# Patient Record
Sex: FEMALE | Race: WHITE | NOT HISPANIC OR LATINO
[De-identification: names, ages, dates, MRNs, and addresses within clinical notes are randomized per-mention and may not be internally consistent; named-entity substitution may affect disease eponyms.]

---

## 2017-10-25 ENCOUNTER — RESULT REVIEW (OUTPATIENT)
Age: 22
End: 2017-10-25

## 2018-11-05 ENCOUNTER — RESULT REVIEW (OUTPATIENT)
Age: 23
End: 2018-11-05

## 2019-03-22 ENCOUNTER — APPOINTMENT (OUTPATIENT)
Dept: RHEUMATOLOGY | Facility: CLINIC | Age: 24
End: 2019-03-22
Payer: COMMERCIAL

## 2019-03-22 VITALS
TEMPERATURE: 98.1 F | HEIGHT: 64 IN | SYSTOLIC BLOOD PRESSURE: 120 MMHG | HEART RATE: 67 BPM | DIASTOLIC BLOOD PRESSURE: 78 MMHG | BODY MASS INDEX: 27.66 KG/M2 | RESPIRATION RATE: 16 BRPM | OXYGEN SATURATION: 99 % | WEIGHT: 162 LBS

## 2019-03-22 DIAGNOSIS — Z86.19 PERSONAL HISTORY OF OTHER INFECTIOUS AND PARASITIC DISEASES: ICD-10-CM

## 2019-03-22 DIAGNOSIS — Z82.3 FAMILY HISTORY OF STROKE: ICD-10-CM

## 2019-03-22 DIAGNOSIS — Z82.61 FAMILY HISTORY OF ARTHRITIS: ICD-10-CM

## 2019-03-22 PROCEDURE — 99243 OFF/OP CNSLTJ NEW/EST LOW 30: CPT

## 2019-03-22 RX ORDER — BACILLUS COAGULANS/INULIN 1B-250 MG
CAPSULE ORAL
Refills: 0 | Status: ACTIVE | COMMUNITY

## 2019-03-23 PROBLEM — Z82.3 FAMILY HISTORY OF CEREBROVASCULAR ACCIDENT (CVA): Status: ACTIVE | Noted: 2019-03-22

## 2019-03-23 PROBLEM — Z82.61 FAMILY HISTORY OF ARTHRITIS: Status: ACTIVE | Noted: 2019-03-22

## 2019-03-23 PROBLEM — Z86.19 HISTORY OF STAPHYLOCOCCAL INFECTION: Status: RESOLVED | Noted: 2019-03-22 | Resolved: 2019-03-23

## 2019-03-23 NOTE — CONSULT LETTER
[Consult Letter:] : I had the pleasure of evaluating your patient, [unfilled]. 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  Rheum eval x 2 in past negative.  Would like to minimize medications \par Little improvement w/ steroids or NSAIDs in past, PT worsened sx. Feels best when eating well and exercising. \par - Sleep:  chronc fatigue with no trouble falling or staying asleep, but always NRS since young age.  No evaluation, no snoring... willing to consider sleep study \par - Chronic LBP:  and not associated w/ swelling or stiffness but can be severe 6/10 and when bad limits activity.  \par - GI issues:  generalized abd pain, bloating for ys, usually constipation... better with healthy diet.  Denies bloody stools, wt loss or other \par - paresthesias migratory pattern w/ no hx weakness, mild \par -  mild anxiety disorder over ys, well controlled w/ no medications.  Denies hx trauma, stress, or drug abuse/ misuse. \par - mild dyscognition, worse w/ poor sleep and stress... \par \par Discussion:  does not at this time meet criteria for FM but has many sx consistent w/ FM... if doesn't address sleep issues likely to become more consistent\par - little to suggest CTD at this time, though likely autoimmune thyroid dz... which then increased risk for other autoimmune conditions.  \par \par 2) Sleep disturbance:  NRS... for ys, consider sleep study.  Likely inefficienct sleep \par \par Plan:\par - full lupus serologies again\par - sleep study\par - lengthy discussion need to monitor and address sleep, continue exercising routinely, and manage stress.  Would not recommend night shift... this would only exacerbate sleep disturbance.  \par - rto 6 wks following sleep study

## 2019-03-23 NOTE — ASSESSMENT
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  Rheum eval x 2 in past negative.  Would like to minimize medications \par Little improvement w/ steroids or NSAIDs in past, PT worsened sx. Feels best when eating well and exercising. \par - Sleep:  chronc fatigue with no trouble falling or staying asleep, but always NRS since young age.  No evaluation, no snoring... willing to consider sleep study \par - Chronic LBP:  and not associated w/ swelling or stiffness but can be severe 6/10 and when bad limits activity.  \par - GI issues:  generalized abd pain, bloating for ys, usually constipation... better with healthy diet.  Denies bloody stools, wt loss or other \par - paresthesias migratory pattern w/ no hx weakness, mild \par -  mild anxiety disorder over ys, well controlled w/ no medications.  Denies hx trauma, stress, or drug abuse/ misuse. \par - mild dyscognition, worse w/ poor sleep and stress... \par \par Discussion:  does not at this time meet criteria for FM but has many sx consistent w/ FM... if doesn't address sleep issues likely to become more consistent\par - little to suggest CTD at this time, though likely autoimmune thyroid dz... which then increased risk for other autoimmune conditions.  \par \par 2) Sleep disturbance:  NRS... for ys, consider sleep study.  Likely inefficienct sleep \par \par Plan:\par - full lupus serologies again\par - sleep study\par - lengthy discussion need to monitor and address sleep, continue exercising routinely, and manage stress.  Would not recommend night shift... this would only exacerbate sleep disturbance.  \par - rto 6 wks following sleep study

## 2019-03-23 NOTE — DATA REVIEWED
[FreeTextEntry1] : Labs: \par 2019 TSH > 7, nl CBC,CMP, CRP, ESR \par \par 2013 & 2017 \par ADAM 1:80H w/ neg subserologies, neg HIV, Hep B/ C, Lyme and other tick born infections\par \par Diagnostics: \par [id number] Imaing of Spine:  cervical/ lumbar w/out spondylosis, scoliosis, HNP

## 2019-03-23 NOTE — PHYSICAL EXAM
[General Appearance - Alert] : alert [General Appearance - In No Acute Distress] : in no acute distress [General Appearance - Well Nourished] : well nourished [General Appearance - Well Developed] : well developed [General Appearance - Well-Appearing] : healthy appearing [Sclera] : the sclera and conjunctiva were normal [PERRL With Normal Accommodation] : pupils were equal in size, round, and reactive to light [Extraocular Movements] : extraocular movements were intact [Outer Ear] : the ears and nose were normal in appearance [Oropharynx] : the oropharynx was normal [Neck Appearance] : the appearance of the neck was normal [Neck Cervical Mass (___cm)] : no neck mass was observed [Jugular Venous Distention Increased] : there was no jugular-venous distention [Thyroid Diffuse Enlargement] : the thyroid was not enlarged [Thyroid Nodule] : there were no palpable thyroid nodules [Auscultation Breath Sounds / Voice Sounds] : lungs were clear to auscultation bilaterally [Heart Rate And Rhythm] : heart rate was normal and rhythm regular [Heart Sounds] : normal S1 and S2 [Heart Sounds Gallop] : no gallops [Murmurs] : no murmurs [Heart Sounds Pericardial Friction Rub] : no pericardial rub [Full Pulse] : the pedal pulses are present [Edema] : there was no peripheral edema [Bowel Sounds] : normal bowel sounds [Abdomen Soft] : soft [Abdomen Tenderness] : non-tender [Abdomen Mass (___ Cm)] : no abdominal mass palpated [Cervical Lymph Nodes Enlarged Posterior Bilaterally] : posterior cervical [Cervical Lymph Nodes Enlarged Anterior Bilaterally] : anterior cervical [Supraclavicular Lymph Nodes Enlarged Bilaterally] : supraclavicular [No CVA Tenderness] : no ~M costovertebral angle tenderness [No Spinal Tenderness] : no spinal tenderness [Abnormal Walk] : normal gait [Nail Clubbing] : no clubbing  or cyanosis of the fingernails [Musculoskeletal - Swelling] : no joint swelling seen [Motor Tone] : muscle strength and tone were normal [Skin Color & Pigmentation] : normal skin color and pigmentation [Skin Turgor] : normal skin turgor [] : no rash [Sensation] : the sensory exam was normal to light touch and pinprick [No Focal Deficits] : no focal deficits [Oriented To Time, Place, And Person] : oriented to person, place, and time [Impaired Insight] : insight and judgment were intact [Affect] : the affect was normal [Over the Past 2 Weeks, Have You Felt Down, Depressed, or Hopeless?] : 1.) Over the past 2 weeks, have you felt down, depressed, or hopeless? No [Over the Past 2 Weeks, Have You Felt Little Interest or Pleasure Doing Things?] : 2.) Over the past 2 weeks, have you felt little interest or pleasure doing things? No [FreeTextEntry1] : no evidence of anxiety today

## 2019-03-23 NOTE — HISTORY OF PRESENT ILLNESS
[FreeTextEntry1] : (Initial HPI 3/22/1) \par 22 yo wf with long hx chronic pain- mild diffuse arthralgias since childhood, several evaluations over many ys negative except low + ADAM 1:80 w/ neg subserologies though TPO/TG done\par Recent dx w/ hypothyroidism and started on synthroid 6 wks ago\par \par - Polyarthralgias:  diffuse, symmetrical and not associated w/ swelling or stiffness but can be severe 6/10 and when bad limits activity.  Today R ankle/ foot pain considerable... otherwise ok.  Little improvement over ys w/ NSAIDs, steroids or PT.\par Does exercise routinely 3-6 days/ wk kick boxing \par \par - Chronic LBP:  worse w/ use, not issue in am... at times severe... PT made worse.  \par \par - Chronic fatigue with no trouble falling or staying asleep, but always NRS since young age.  \par \par - chronic GI issues:  generalized abd pain, bloating for ys, usually constipation... better with healthy diet.  Denies bloody stools, wt loss or other \par \par - Occas paresthesias migratory pattern w/ no hx weakness\par \par - mild anxiety disorder over ys, well controlled w/ no medications.  Denies hx trauma, stress, or drug abuse/ misuse.  Occas ETOH but no illicit drug use, no marijuana.  Sexually active but reports use protection

## 2019-03-23 NOTE — REVIEW OF SYSTEMS
[Feeling Tired] : feeling tired [Abdominal Pain] : abdominal pain [Constipation] : constipation [Arthralgias] : arthralgias [Joint Pain] : joint pain [As Noted in HPI] : as noted in HPI [Sleep Disturbances] : sleep disturbances [Negative] : Heme/Lymph [FreeTextEntry2] : at times feels badly, doing well today [FreeTextEntry5] : mild intermitttent dizziness  [de-identified] : occas paresthesias  [de-identified] : NRS- denies snoring and no idea if any apneic episodes  [de-identified] : denies cytopenias, bleeding or clotting dyscrasias

## 2019-05-31 ENCOUNTER — APPOINTMENT (OUTPATIENT)
Dept: RHEUMATOLOGY | Facility: CLINIC | Age: 24
End: 2019-05-31

## 2019-07-07 PROBLEM — Z86.19 HISTORY OF CLOSTRIDIUM DIFFICILE COLITIS: Status: RESOLVED | Noted: 2019-03-22 | Resolved: 2019-07-07

## 2019-09-30 ENCOUNTER — RECORD ABSTRACTING (OUTPATIENT)
Age: 24
End: 2019-09-30

## 2019-09-30 DIAGNOSIS — R87.610 ATYPICAL SQUAMOUS CELLS OF UNDETERMINED SIGNIFICANCE ON CYTOLOGIC SMEAR OF CERVIX (ASC-US): ICD-10-CM

## 2019-09-30 DIAGNOSIS — Z80.3 FAMILY HISTORY OF MALIGNANT NEOPLASM OF BREAST: ICD-10-CM

## 2019-09-30 DIAGNOSIS — Z82.49 FAMILY HISTORY OF ISCHEMIC HEART DISEASE AND OTHER DISEASES OF THE CIRCULATORY SYSTEM: ICD-10-CM

## 2019-09-30 DIAGNOSIS — Z87.448 PERSONAL HISTORY OF OTHER DISEASES OF URINARY SYSTEM: ICD-10-CM

## 2019-09-30 DIAGNOSIS — Z86.19 PERSONAL HISTORY OF OTHER INFECTIOUS AND PARASITIC DISEASES: ICD-10-CM

## 2019-09-30 DIAGNOSIS — Z78.9 OTHER SPECIFIED HEALTH STATUS: ICD-10-CM

## 2019-09-30 LAB — CYTOLOGY CVX/VAG DOC THIN PREP: NORMAL

## 2019-11-18 ENCOUNTER — APPOINTMENT (OUTPATIENT)
Dept: OBGYN | Facility: CLINIC | Age: 24
End: 2019-11-18
Payer: COMMERCIAL

## 2019-11-18 VITALS
WEIGHT: 155 LBS | BODY MASS INDEX: 26.46 KG/M2 | DIASTOLIC BLOOD PRESSURE: 62 MMHG | HEIGHT: 64 IN | SYSTOLIC BLOOD PRESSURE: 104 MMHG

## 2019-11-18 PROCEDURE — 99395 PREV VISIT EST AGE 18-39: CPT

## 2019-11-18 NOTE — HISTORY OF PRESENT ILLNESS
[Last Mammogram ___] : Last Mammogram was [unfilled] [Last Bone Density ___] : Last bone density studies [unfilled] [Last Colonoscopy ___] : Last colonoscopy [unfilled] [Last Pap ___] : Last cervical pap smear was [unfilled] [Menarche Age: ____] : age at menarche was [unfilled] [Definite:  ___ (Date)] : the last menstrual period was [unfilled] [Sexually Active] : is sexually active [Monogamous] : is monogamous [Contraception] : uses contraception [Oral Contraceptives] : uses oral contraceptives [Male ___] : [unfilled] male [Yes] : yes

## 2019-11-23 LAB
C TRACH RRNA SPEC QL NAA+PROBE: NOT DETECTED
N GONORRHOEA RRNA SPEC QL NAA+PROBE: NOT DETECTED
SOURCE TP AMPLIFICATION: NORMAL

## 2019-11-30 LAB — CYTOLOGY CVX/VAG DOC THIN PREP: ABNORMAL

## 2020-10-21 ENCOUNTER — RX RENEWAL (OUTPATIENT)
Age: 25
End: 2020-10-21

## 2020-11-30 ENCOUNTER — APPOINTMENT (OUTPATIENT)
Dept: FAMILY MEDICINE | Facility: CLINIC | Age: 25
End: 2020-11-30
Payer: COMMERCIAL

## 2020-11-30 ENCOUNTER — APPOINTMENT (OUTPATIENT)
Dept: OBGYN | Facility: CLINIC | Age: 25
End: 2020-11-30
Payer: COMMERCIAL

## 2020-11-30 VITALS
TEMPERATURE: 97.3 F | BODY MASS INDEX: 25.69 KG/M2 | WEIGHT: 145 LBS | SYSTOLIC BLOOD PRESSURE: 110 MMHG | DIASTOLIC BLOOD PRESSURE: 72 MMHG | HEIGHT: 63 IN

## 2020-11-30 VITALS
DIASTOLIC BLOOD PRESSURE: 70 MMHG | HEART RATE: 76 BPM | TEMPERATURE: 98.3 F | SYSTOLIC BLOOD PRESSURE: 110 MMHG | OXYGEN SATURATION: 98 % | HEIGHT: 64 IN | WEIGHT: 152 LBS | BODY MASS INDEX: 25.95 KG/M2

## 2020-11-30 DIAGNOSIS — Z86.59 PERSONAL HISTORY OF OTHER MENTAL AND BEHAVIORAL DISORDERS: ICD-10-CM

## 2020-11-30 DIAGNOSIS — Z83.3 FAMILY HISTORY OF DIABETES MELLITUS: ICD-10-CM

## 2020-11-30 DIAGNOSIS — Z87.898 PERSONAL HISTORY OF OTHER SPECIFIED CONDITIONS: ICD-10-CM

## 2020-11-30 DIAGNOSIS — Z82.49 FAMILY HISTORY OF ISCHEMIC HEART DISEASE AND OTHER DISEASES OF THE CIRCULATORY SYSTEM: ICD-10-CM

## 2020-11-30 DIAGNOSIS — Z01.419 ENCOUNTER FOR GYNECOLOGICAL EXAMINATION (GENERAL) (ROUTINE) W/OUT ABNORMAL FINDINGS: ICD-10-CM

## 2020-11-30 DIAGNOSIS — Z80.8 FAMILY HISTORY OF MALIGNANT NEOPLASM OF OTHER ORGANS OR SYSTEMS: ICD-10-CM

## 2020-11-30 DIAGNOSIS — Z30.9 ENCOUNTER FOR CONTRACEPTIVE MANAGEMENT, UNSPECIFIED: ICD-10-CM

## 2020-11-30 PROCEDURE — 99395 PREV VISIT EST AGE 18-39: CPT

## 2020-11-30 PROCEDURE — 99072 ADDL SUPL MATRL&STAF TM PHE: CPT

## 2020-11-30 PROCEDURE — 99214 OFFICE O/P EST MOD 30 MIN: CPT

## 2020-11-30 RX ORDER — ACETAMINOPHEN 325 MG
5000 TABLET ORAL
Refills: 0 | Status: DISCONTINUED | COMMUNITY
End: 2020-11-30

## 2020-11-30 RX ORDER — NORETHINDRONE ACETATE AND ETHINYL ESTRADIOL AND FERROUS FUMARATE 1.5-30(21)
1.5-3 KIT ORAL
Refills: 0 | Status: DISCONTINUED | COMMUNITY
End: 2020-11-30

## 2020-11-30 RX ORDER — NORETHINDRONE ACETATE AND ETHINYL ESTRADIOL AND FERROUS FUMARATE 1.5-30(21)
1.5-3 KIT ORAL
Qty: 2 | Refills: 0 | Status: DISCONTINUED | COMMUNITY
Start: 2019-10-04 | End: 2020-11-30

## 2020-11-30 NOTE — HISTORY OF PRESENT ILLNESS
[FreeTextEntry1] : New pt CPE\par No complaints today [de-identified] : 24 yo female with pmhx of hypothyroidism presents to establish care. Pt has no acute complaints. Denies fever, chills, cp, palpitations, sob, n/v, heat/cold intolerance, muscle weakness, dizziness or calf pain.

## 2020-11-30 NOTE — ASSESSMENT
[FreeTextEntry1] : Hypothyroidism: currently on synthroid 25 mcg daily, asymptomatic, c/w current regimen, f/u TSH \par Hx of polyarthralgia with weakly positive ADAM: pt sees rheumatology, currently asymptomatic, f/u rheum\par Difficulty sleeping: start melatonin 5 mg daily \par RTC 4 - 6 weeks

## 2020-11-30 NOTE — HEALTH RISK ASSESSMENT
[Very Good] : ~his/her~  mood as very good [] : No [Yes] : Yes [Monthly or less (1 pt)] : Monthly or less (1 point) [1 or 2 (0 pts)] : 1 or 2 (0 points) [Never (0 pts)] : Never (0 points) [No] : In the past 12 months have you used drugs other than those required for medical reasons? No [No falls in past year] : Patient reported no falls in the past year [0] : 2) Feeling down, depressed, or hopeless: Not at all (0) [Audit-CScore] : 1 [de-identified] : regularly  [de-identified] : good [NUC4Mgthm] : 0 [Patient reported PAP Smear was normal] : Patient reported PAP Smear was normal [HIV test declined] : HIV test declined [Hepatitis C test declined] : Hepatitis C test declined [With Family] : lives with family [Employed] : employed [Single] : single [Sexually Active] : sexually active [High Risk Behavior] : no high risk behavior [Feels Safe at Home] : Feels safe at home [Fully functional (bathing, dressing, toileting, transferring, walking, feeding)] : Fully functional (bathing, dressing, toileting, transferring, walking, feeding) [Fully functional (using the telephone, shopping, preparing meals, housekeeping, doing laundry, using] : Fully functional and needs no help or supervision to perform IADLs (using the telephone, shopping, preparing meals, housekeeping, doing laundry, using transportation, managing medications and managing finances) [Reports changes in hearing] : Reports no changes in hearing [Reports changes in vision] : Reports no changes in vision [Reports changes in dental health] : Reports no changes in dental health [PapSmearDate] : 01/19

## 2020-12-05 PROBLEM — Z80.8 FAMILY HISTORY OF MALIGNANT NEOPLASM OF SKIN: Status: ACTIVE | Noted: 2020-11-30

## 2020-12-05 LAB
C TRACH RRNA SPEC QL NAA+PROBE: NOT DETECTED
CYTOLOGY CVX/VAG DOC THIN PREP: ABNORMAL
N GONORRHOEA RRNA SPEC QL NAA+PROBE: NOT DETECTED
SOURCE TP AMPLIFICATION: NORMAL
TSH SERPL-ACNC: 1.56 UIU/ML

## 2020-12-05 NOTE — HISTORY OF PRESENT ILLNESS
[Patient reported PAP Smear was normal] : Patient reported PAP Smear was normal [Oral Contraceptive] : uses oral contraception pills [Y] : Patient is sexually active [N] : Patient denies prior pregnancies [Menarche Age: ____] : age at menarche was [unfilled] [No] : Patient does not have concerns regarding sex [Currently Active] : currently active [Men] : men [Yes] : Yes [TextBox_4] : 25-year-old G0 LMP 11/9/2020 annual gynecological exam\par \par Patient is requesting to continue oral contraceptives and tolerating them well\par \par Instructions and precautions for use were reviewed\par \par  [PapSmeardate] : 11/18/2019 [LMPDate] : 11/09/2020 [PGHxTotal] : 0 [FreeTextEntry1] : 11/09/2020 [FreeTextEntry3] : oral contraceptive

## 2020-12-23 PROBLEM — Z30.9 ENCOUNTER FOR BIRTH CONTROL: Status: RESOLVED | Noted: 2019-10-04 | Resolved: 2020-12-23

## 2020-12-23 PROBLEM — Z01.419 ENCOUNTER FOR GYNECOLOGICAL EXAMINATION WITHOUT ABNORMAL FINDING: Status: RESOLVED | Noted: 2019-11-18 | Resolved: 2020-12-23

## 2021-02-02 ENCOUNTER — APPOINTMENT (OUTPATIENT)
Dept: FAMILY MEDICINE | Facility: CLINIC | Age: 26
End: 2021-02-02

## 2021-08-09 ENCOUNTER — NON-APPOINTMENT (OUTPATIENT)
Age: 26
End: 2021-08-09

## 2021-08-09 ENCOUNTER — APPOINTMENT (OUTPATIENT)
Dept: FAMILY MEDICINE | Facility: CLINIC | Age: 26
End: 2021-08-09
Payer: COMMERCIAL

## 2021-08-09 VITALS
TEMPERATURE: 98.4 F | HEART RATE: 64 BPM | WEIGHT: 145 LBS | DIASTOLIC BLOOD PRESSURE: 78 MMHG | BODY MASS INDEX: 25.69 KG/M2 | HEIGHT: 63 IN | SYSTOLIC BLOOD PRESSURE: 120 MMHG | OXYGEN SATURATION: 100 %

## 2021-08-09 DIAGNOSIS — Z87.898 PERSONAL HISTORY OF OTHER SPECIFIED CONDITIONS: ICD-10-CM

## 2021-08-09 DIAGNOSIS — Z87.42 PERSONAL HISTORY OF OTHER DISEASES OF THE FEMALE GENITAL TRACT: ICD-10-CM

## 2021-08-09 DIAGNOSIS — Z86.14 PERSONAL HISTORY OF METHICILLIN RESISTANT STAPHYLOCOCCUS AUREUS INFECTION: ICD-10-CM

## 2021-08-09 PROCEDURE — G0442 ANNUAL ALCOHOL SCREEN 15 MIN: CPT | Mod: NC

## 2021-08-09 PROCEDURE — 99395 PREV VISIT EST AGE 18-39: CPT | Mod: 25

## 2021-08-09 PROCEDURE — G0444 DEPRESSION SCREEN ANNUAL: CPT | Mod: NC,59

## 2021-08-09 RX ORDER — NORETHINDRONE ACETATE AND ETHINYL ESTRADIOL AND FERROUS FUMARATE 1.5-30(21)
1.5-3 KIT ORAL DAILY
Qty: 84 | Refills: 3 | Status: DISCONTINUED | COMMUNITY
Start: 2019-11-18 | End: 2021-08-09

## 2021-08-09 RX ORDER — LEVOTHYROXINE SODIUM 200 MCG
VIAL (EA) INTRAVENOUS
Refills: 0 | Status: DISCONTINUED | COMMUNITY
End: 2021-08-09

## 2021-08-09 NOTE — HEALTH RISK ASSESSMENT
[Patient reported PAP Smear was normal] : Patient reported PAP Smear was normal [Very Good] : ~his/her~  mood as very good [] : No [1 or 2 (0 pts)] : 1 or 2 (0 points) [Never (0 pts)] : Never (0 points) [No] : In the past 12 months have you used drugs other than those required for medical reasons? No [No falls in past year] : Patient reported no falls in the past year [0] : 2) Feeling down, depressed, or hopeless: Not at all (0) [PHQ-2 Negative - No further assessment needed] : PHQ-2 Negative - No further assessment needed [Audit-CScore] : 0 [de-identified] : regularly [de-identified] : healthy [KLP2Vpywc] : 0 [HIV test declined] : HIV test declined [Hepatitis C test declined] : Hepatitis C test declined [With Family] : lives with family [Employed] : employed [Significant Other] : lives with significant other [Sexually Active] : sexually active [High Risk Behavior] : no high risk behavior [Feels Safe at Home] : Feels safe at home [Fully functional (bathing, dressing, toileting, transferring, walking, feeding)] : Fully functional (bathing, dressing, toileting, transferring, walking, feeding) [Fully functional (using the telephone, shopping, preparing meals, housekeeping, doing laundry, using] : Fully functional and needs no help or supervision to perform IADLs (using the telephone, shopping, preparing meals, housekeeping, doing laundry, using transportation, managing medications and managing finances) [Reports changes in hearing] : Reports no changes in hearing [Reports changes in vision] : Reports no changes in vision [Reports changes in dental health] : Reports no changes in dental health [PapSmearDate] : 11/20 [PapSmearComments] : NILM

## 2021-08-09 NOTE — COUNSELING
[Fall prevention counseling provided] : Fall prevention counseling provided [Risk of tobacco use and health benefits of smoking cessation discussed] : Risk of tobacco use and health benefits of smoking cessation discussed [AUDIT-C Screening administered and reviewed] : AUDIT-C Screening administered and reviewed [Potential consequences of obesity discussed] : Potential consequences of obesity discussed [Benefits of weight loss discussed] : Benefits of weight loss discussed [Encouraged to maintain food diary] : Encouraged to maintain food diary [Encouraged to increase physical activity] : Encouraged to increase physical activity [Encouraged to use exercise tracking device] : Encouraged to use exercise tracking device

## 2021-08-09 NOTE — ASSESSMENT
[FreeTextEntry1] : Annual physical: f/u routine labwork\par Hx of hypothyroidism: f/u TSH, Free T4, c/w levothyroxine 25 mcg po daily\par Hx of polyarthralgia: currently asymptomatic, weakly positive ADAM in the past, f/u rheumatology\par RTC 3 wks

## 2021-08-09 NOTE — HISTORY OF PRESENT ILLNESS
[FreeTextEntry1] : CPE [de-identified] : 27 yo female presents for annual physical. No acute complaints. Denies fever, chills, cp, palpitations, sob, nv, heat/cold intolerance, dizziness, melena, hematochezia, muscle weakness, loss of sensation, bowel/bladder incontinence or calf pain.\par

## 2021-09-16 ENCOUNTER — OUTPATIENT (OUTPATIENT)
Dept: OUTPATIENT SERVICES | Facility: HOSPITAL | Age: 26
LOS: 1 days | End: 2021-09-16
Payer: COMMERCIAL

## 2021-09-16 ENCOUNTER — APPOINTMENT (OUTPATIENT)
Dept: RADIOLOGY | Facility: CLINIC | Age: 26
End: 2021-09-16
Payer: COMMERCIAL

## 2021-09-16 DIAGNOSIS — M25.531 PAIN IN RIGHT WRIST: ICD-10-CM

## 2021-09-16 PROCEDURE — 73110 X-RAY EXAM OF WRIST: CPT

## 2021-09-16 PROCEDURE — 73110 X-RAY EXAM OF WRIST: CPT | Mod: 26,RT

## 2021-10-06 ENCOUNTER — APPOINTMENT (OUTPATIENT)
Dept: MRI IMAGING | Facility: CLINIC | Age: 26
End: 2021-10-06

## 2021-12-04 ENCOUNTER — NON-APPOINTMENT (OUTPATIENT)
Age: 26
End: 2021-12-04

## 2021-12-04 ENCOUNTER — APPOINTMENT (OUTPATIENT)
Dept: OBGYN | Facility: CLINIC | Age: 26
End: 2021-12-04
Payer: COMMERCIAL

## 2021-12-04 VITALS
HEIGHT: 63 IN | DIASTOLIC BLOOD PRESSURE: 78 MMHG | WEIGHT: 143 LBS | BODY MASS INDEX: 25.34 KG/M2 | SYSTOLIC BLOOD PRESSURE: 118 MMHG

## 2021-12-04 DIAGNOSIS — Z01.419 ENCOUNTER FOR GYNECOLOGICAL EXAMINATION (GENERAL) (ROUTINE) W/OUT ABNORMAL FINDINGS: ICD-10-CM

## 2021-12-04 DIAGNOSIS — Z11.3 ENCOUNTER FOR SCREENING FOR INFECTIONS WITH A PREDOMINANTLY SEXUAL MODE OF TRANSMISSION: ICD-10-CM

## 2021-12-04 PROCEDURE — 99395 PREV VISIT EST AGE 18-39: CPT

## 2021-12-04 RX ORDER — LACTOBACILLUS RHAMNOSUS GG 10B CELL
CAPSULE ORAL
Refills: 0 | Status: DISCONTINUED | COMMUNITY
End: 2021-12-04

## 2021-12-06 PROBLEM — Z01.419 ENCOUNTER FOR ANNUAL ROUTINE GYNECOLOGICAL EXAMINATION: Status: RESOLVED | Noted: 2021-12-06 | Resolved: 2021-12-20

## 2021-12-06 NOTE — PLAN
[FreeTextEntry1] : Importance of monthly self breast exam reviewed\par The importance of daily calcium exercise and vitamin D reviewed\par The importance of safe sex was reviewed\par Follow-up in 1 year discussed\par Instructions and precautions for oral contraceptive use reviewed\par Patient verbalizes good understanding of all instructions

## 2021-12-06 NOTE — HISTORY OF PRESENT ILLNESS
[Oral Contraceptive] : uses oral contraception pills [Y] : Patient is sexually active [N] : Patient denies prior pregnancies [Menarche Age: ____] : age at menarche was [unfilled] [Currently Active] : currently active [Men] : men [No] : No [Withdrawal] : uses withdrawal [PapSmeardate] : 11/30/20 [TextBox_31] : NEG [GonorrheaDate] : 11/30/20 [TextBox_63] : NEG [ChlamydiaDate] : 11/30/20 [TextBox_68] : NEG [HPVDate] : 1 [LMPDate] : 11/6/21 [PGHxTotal] : 0 [FreeTextEntry1] : 11/6/21

## 2021-12-06 NOTE — COUNSELING
[Nutrition/ Exercise/ Weight Management] : nutrition, exercise, weight management [Vitamins/Supplements] : vitamins/supplements [Smoking Cessation] : smoking cessation [Breast Self Exam] : breast self exam [Contraception/ Emergency Contraception/ Safe Sexual Practices] : contraception, emergency contraception, safe sexual practices [STD (testing, results, tx)] : STD (testing, results, tx) [Vaccines] : vaccines

## 2021-12-14 LAB — CYTOLOGY CVX/VAG DOC THIN PREP: ABNORMAL

## 2022-03-01 ENCOUNTER — RX RENEWAL (OUTPATIENT)
Age: 27
End: 2022-03-01

## 2022-05-17 ENCOUNTER — RX RENEWAL (OUTPATIENT)
Age: 27
End: 2022-05-17

## 2022-06-10 ENCOUNTER — NON-APPOINTMENT (OUTPATIENT)
Age: 27
End: 2022-06-10

## 2022-06-30 ENCOUNTER — NON-APPOINTMENT (OUTPATIENT)
Age: 27
End: 2022-06-30

## 2022-07-14 ENCOUNTER — NON-APPOINTMENT (OUTPATIENT)
Age: 27
End: 2022-07-14

## 2022-08-03 ENCOUNTER — RX RENEWAL (OUTPATIENT)
Age: 27
End: 2022-08-03

## 2022-08-19 ENCOUNTER — NON-APPOINTMENT (OUTPATIENT)
Age: 27
End: 2022-08-19

## 2022-08-30 ENCOUNTER — NON-APPOINTMENT (OUTPATIENT)
Age: 27
End: 2022-08-30

## 2022-08-31 ENCOUNTER — APPOINTMENT (OUTPATIENT)
Dept: FAMILY MEDICINE | Facility: CLINIC | Age: 27
End: 2022-08-31

## 2022-08-31 VITALS
DIASTOLIC BLOOD PRESSURE: 64 MMHG | TEMPERATURE: 98.2 F | HEART RATE: 62 BPM | BODY MASS INDEX: 25.69 KG/M2 | HEIGHT: 63 IN | WEIGHT: 145 LBS | SYSTOLIC BLOOD PRESSURE: 110 MMHG | OXYGEN SATURATION: 100 %

## 2022-08-31 DIAGNOSIS — M25.531 PAIN IN RIGHT WRIST: ICD-10-CM

## 2022-08-31 DIAGNOSIS — Z23 ENCOUNTER FOR IMMUNIZATION: ICD-10-CM

## 2022-08-31 PROCEDURE — 99395 PREV VISIT EST AGE 18-39: CPT | Mod: 25

## 2022-08-31 PROCEDURE — G0444 DEPRESSION SCREEN ANNUAL: CPT | Mod: 59

## 2022-08-31 RX ORDER — CHLORHEXIDINE GLUCONATE 4 %
5 LIQUID (ML) TOPICAL
Qty: 30 | Refills: 0 | Status: DISCONTINUED | COMMUNITY
Start: 2020-11-30 | End: 2022-08-31

## 2022-08-31 NOTE — ASSESSMENT
[FreeTextEntry1] : Annual physical: f/u routine labwork\par Elevated ADAM: mild, 1:80, symptoms significantly improved, recommend exercise/stretching, f/u rheum\par Hypothyroid: c/w levothyroxine 25 mcg po daily, f/u TSH/free T4\par Allergic rhinitis: start fluticasone prn, will consider allergist if no improvement\par RTC 3 wks

## 2022-08-31 NOTE — HISTORY OF PRESENT ILLNESS
[FreeTextEntry1] : CPE [de-identified] : 28 yo female presents for annual physical. Reports persistent nasal congestion with sneezing for the past 2 months. Reports feeling well. Denies fever, chills, cp, palpitations, sob, nv, heat/cold intolerance, dizziness, melena, hematochezia, muscle weakness, loss of sensation, bowel/bladder incontinence or calf pain.\par

## 2022-08-31 NOTE — REVIEW OF SYSTEMS
[Nasal Discharge] : nasal discharge [Negative] : Psychiatric [Earache] : no earache [Hearing Loss] : no hearing loss [Nosebleed] : no nosebleeds [Hoarseness] : no hoarseness [Sore Throat] : no sore throat [Postnasal Drip] : no postnasal drip

## 2022-08-31 NOTE — HEALTH RISK ASSESSMENT
[Patient reported PAP Smear was normal] : Patient reported PAP Smear was normal [Very Good] : ~his/her~  mood as very good [Never] : Never [Yes] : Yes [Monthly or less (1 pt)] : Monthly or less (1 point) [1 or 2 (0 pts)] : 1 or 2 (0 points) [Never (0 pts)] : Never (0 points) [No] : In the past 12 months have you used drugs other than those required for medical reasons? No [No falls in past year] : Patient reported no falls in the past year [0] : 2) Feeling down, depressed, or hopeless: Not at all (0) [PHQ-2 Negative - No further assessment needed] : PHQ-2 Negative - No further assessment needed [HIV test declined] : HIV test declined [Hepatitis C test declined] : Hepatitis C test declined [With Family] : lives with family [Employed] : employed [Significant Other] : lives with significant other [Sexually Active] : sexually active [Feels Safe at Home] : Feels safe at home [Fully functional (bathing, dressing, toileting, transferring, walking, feeding)] : Fully functional (bathing, dressing, toileting, transferring, walking, feeding) [Fully functional (using the telephone, shopping, preparing meals, housekeeping, doing laundry, using] : Fully functional and needs no help or supervision to perform IADLs (using the telephone, shopping, preparing meals, housekeeping, doing laundry, using transportation, managing medications and managing finances) [Audit-CScore] : 1 [de-identified] : regularly [de-identified] : healthy [VDH4Lkbkz] : 0 [High Risk Behavior] : no high risk behavior [Reports changes in hearing] : Reports no changes in hearing [Reports changes in vision] : Reports no changes in vision [Reports changes in dental health] : Reports no changes in dental health [PapSmearDate] : 12/21 [PapSmearComments] : NILM

## 2022-09-10 ENCOUNTER — LABORATORY RESULT (OUTPATIENT)
Age: 27
End: 2022-09-10

## 2022-09-15 ENCOUNTER — RX RENEWAL (OUTPATIENT)
Age: 27
End: 2022-09-15

## 2022-10-03 LAB
BASOPHILS # BLD AUTO: 0.03 K/UL
BASOPHILS NFR BLD AUTO: 0.7 %
EBV EA AB SER IA-ACNC: <5 U/ML
EBV EA AB TITR SER IF: POSITIVE
EBV EA IGG SER QL IA: >600 U/ML
EBV EA IGG SER-ACNC: NEGATIVE
EBV EA IGM SER IA-ACNC: NEGATIVE
EBV PATRN SPEC IB-IMP: NORMAL
EBV VCA IGG SER IA-ACNC: 124 U/ML
EBV VCA IGM SER QL IA: <10 U/ML
EOSINOPHIL # BLD AUTO: 0.1 K/UL
EOSINOPHIL NFR BLD AUTO: 2.2 %
EPSTEIN-BARR VIRUS CAPSID ANTIGEN IGG: POSITIVE
HCT VFR BLD CALC: 42.5 %
HGB BLD-MCNC: 13.6 G/DL
IMM GRANULOCYTES NFR BLD AUTO: 0 %
LYMPHOCYTES # BLD AUTO: 1.98 K/UL
LYMPHOCYTES NFR BLD AUTO: 43 %
MAN DIFF?: NORMAL
MCHC RBC-ENTMCNC: 30 PG
MCHC RBC-ENTMCNC: 32 GM/DL
MCV RBC AUTO: 93.8 FL
MONOCYTES # BLD AUTO: 0.32 K/UL
MONOCYTES NFR BLD AUTO: 6.9 %
NEUTROPHILS # BLD AUTO: 2.18 K/UL
NEUTROPHILS NFR BLD AUTO: 47.2 %
PLATELET # BLD AUTO: 207 K/UL
RBC # BLD: 4.53 M/UL
RBC # FLD: 13 %
WBC # FLD AUTO: 4.61 K/UL

## 2022-10-04 LAB — HETEROPH AB SER QL: NEGATIVE

## 2022-10-13 ENCOUNTER — APPOINTMENT (OUTPATIENT)
Dept: DERMATOLOGY | Facility: CLINIC | Age: 27
End: 2022-10-13

## 2022-10-21 ENCOUNTER — RX RENEWAL (OUTPATIENT)
Age: 27
End: 2022-10-21

## 2022-12-05 ENCOUNTER — NON-APPOINTMENT (OUTPATIENT)
Age: 27
End: 2022-12-05

## 2022-12-10 ENCOUNTER — APPOINTMENT (OUTPATIENT)
Dept: OBGYN | Facility: CLINIC | Age: 27
End: 2022-12-10

## 2022-12-10 ENCOUNTER — LABORATORY RESULT (OUTPATIENT)
Age: 27
End: 2022-12-10

## 2022-12-10 VITALS
BODY MASS INDEX: 24.8 KG/M2 | HEIGHT: 63 IN | SYSTOLIC BLOOD PRESSURE: 116 MMHG | DIASTOLIC BLOOD PRESSURE: 60 MMHG | WEIGHT: 140 LBS

## 2022-12-10 DIAGNOSIS — Z12.4 ENCOUNTER FOR SCREENING FOR MALIGNANT NEOPLASM OF CERVIX: ICD-10-CM

## 2022-12-10 DIAGNOSIS — Z12.39 ENCOUNTER FOR OTHER SCREENING FOR MALIGNANT NEOPLASM OF BREAST: ICD-10-CM

## 2022-12-10 PROCEDURE — 99395 PREV VISIT EST AGE 18-39: CPT

## 2022-12-10 NOTE — HISTORY OF PRESENT ILLNESS
[N] : Patient reports normal menses [Oral Contraceptive] : uses oral contraception pills [Y] : Positive pregnancy history [Currently Active] : currently active [No] : No [Menarche Age: ____] : age at menarche was [unfilled] [PapSmeardate] : 12/04/21 [TextBox_31] : NEG  [HPVDate] : 12/04/21 [TextBox_78] : NEG  [LMPDate] : 12/06/22 [PGHxTotal] : 0 [FreeTextEntry1] : 12/06/22

## 2023-01-05 ENCOUNTER — NON-APPOINTMENT (OUTPATIENT)
Age: 28
End: 2023-01-05

## 2023-01-07 LAB — CYTOLOGY CVX/VAG DOC THIN PREP: ABNORMAL

## 2023-01-16 ENCOUNTER — NON-APPOINTMENT (OUTPATIENT)
Age: 28
End: 2023-01-16

## 2023-01-16 ENCOUNTER — APPOINTMENT (OUTPATIENT)
Dept: OBGYN | Facility: CLINIC | Age: 28
End: 2023-01-16
Payer: COMMERCIAL

## 2023-01-16 VITALS
SYSTOLIC BLOOD PRESSURE: 130 MMHG | WEIGHT: 140 LBS | BODY MASS INDEX: 24.8 KG/M2 | DIASTOLIC BLOOD PRESSURE: 60 MMHG | HEIGHT: 63 IN

## 2023-01-16 DIAGNOSIS — Z32.02 ENCOUNTER FOR PREGNANCY TEST, RESULT NEGATIVE: ICD-10-CM

## 2023-01-16 LAB
HCG UR QL: NEGATIVE
QUALITY CONTROL: YES

## 2023-01-16 PROCEDURE — 81025 URINE PREGNANCY TEST: CPT

## 2023-01-16 PROCEDURE — 57454 BX/CURETT OF CERVIX W/SCOPE: CPT

## 2023-01-16 NOTE — PROCEDURE
[Colposcopy] : Colposcopy  [Consent Obtained] : Consent obtained [Risks] : risks [Benefits] : benefits [Alternatives] : alternatives [ASCUS] : ASCUS [HPV High Risk] : HPV high risk [No Premedication] : no premedication [Colposcopy Adequate] : colposcopy adequate [SCI Fully Visualized] : SCI fully visualized [ECC Performed] : ECC performed [Biopsy] : biopsy taken [Hemostasis Obtained] : Hemostasis obtained [Tolerated Well] : the patient tolerated the procedure well [de-identified] : 1 [de-identified] : acetowhite around transformation zone, most prominent at 7  [de-identified] : cervix 7 o clock [de-identified] : pressure [de-identified] : tischler biopsy without issues [de-identified] : low grade changes, i will call patient with the biopsy results

## 2023-01-18 ENCOUNTER — RX RENEWAL (OUTPATIENT)
Age: 28
End: 2023-01-18

## 2023-01-24 ENCOUNTER — NON-APPOINTMENT (OUTPATIENT)
Age: 28
End: 2023-01-24

## 2023-01-25 LAB — CORE LAB BIOPSY: NORMAL

## 2023-04-17 ENCOUNTER — RX RENEWAL (OUTPATIENT)
Age: 28
End: 2023-04-17

## 2023-04-24 ENCOUNTER — RX RENEWAL (OUTPATIENT)
Age: 28
End: 2023-04-24

## 2023-05-28 ENCOUNTER — NON-APPOINTMENT (OUTPATIENT)
Age: 28
End: 2023-05-28

## 2023-08-05 ENCOUNTER — APPOINTMENT (OUTPATIENT)
Dept: OBGYN | Facility: CLINIC | Age: 28
End: 2023-08-05
Payer: COMMERCIAL

## 2023-08-05 VITALS
WEIGHT: 141.38 LBS | BODY MASS INDEX: 25.05 KG/M2 | SYSTOLIC BLOOD PRESSURE: 120 MMHG | DIASTOLIC BLOOD PRESSURE: 70 MMHG | HEIGHT: 63 IN

## 2023-08-05 PROCEDURE — 99213 OFFICE O/P EST LOW 20 MIN: CPT

## 2023-08-05 NOTE — HISTORY OF PRESENT ILLNESS
[Oral Contraceptive] : uses oral contraception pills [Y] : Patient is sexually active [N] : Patient denies prior pregnancies [Menarche Age: ____] : age at menarche was [unfilled] [No] : Patient does not have concerns regarding sex [PapSmeardate] : 12/10/22 [TextBox_31] : ASC-US [TextBox_63] : NEG [GonorrheaDate] : 12/10/22 [ChlamydiaDate] : 12/10/22 [TextBox_68] : NEG [TextBox_78] : POS [HPVDate] : 12/10/22 [LMPDate] : 07/17/23 [PGHxTotal] : 0 [FreeTextEntry1] : 07/17/23

## 2023-08-05 NOTE — PHYSICAL EXAM
[Chaperone Present] : A chaperone was present in the examining room during all aspects of the physical examination [FreeTextEntry1] : CALVIN Hazel [Appropriately responsive] : appropriately responsive [Alert] : alert [No Acute Distress] : no acute distress [Oriented x3] : oriented x3 [Labia Majora] : normal [Labia Minora] : normal [Normal] : normal [Tenderness] : nontender [Uterine Adnexae] : non-palpable

## 2023-08-05 NOTE — PLAN
[FreeTextEntry1] : -Benign exam today -We reviewed possible recurrent BV; will treat as indicated pending vaginal cultures -Discussed boric acid vaginal suppositories for 1-2 days after menses, limiting use to no more than twice weekly -Switch to unscented soap, nothing PV for cleansing ever -Call or RTO PRN for any new problems, questions or concerns

## 2023-08-15 ENCOUNTER — NON-APPOINTMENT (OUTPATIENT)
Age: 28
End: 2023-08-15

## 2023-08-17 LAB
CANDIDA VAG CYTO: NOT DETECTED
G VAGINALIS+PREV SP MTYP VAG QL MICRO: NOT DETECTED
T VAGINALIS VAG QL WET PREP: NOT DETECTED

## 2023-09-13 ENCOUNTER — APPOINTMENT (OUTPATIENT)
Dept: FAMILY MEDICINE | Facility: CLINIC | Age: 28
End: 2023-09-13
Payer: COMMERCIAL

## 2023-09-13 VITALS
DIASTOLIC BLOOD PRESSURE: 72 MMHG | SYSTOLIC BLOOD PRESSURE: 110 MMHG | HEIGHT: 63 IN | WEIGHT: 144 LBS | TEMPERATURE: 98 F | OXYGEN SATURATION: 95 % | HEART RATE: 74 BPM | BODY MASS INDEX: 25.52 KG/M2

## 2023-09-13 DIAGNOSIS — N94.9 UNSPECIFIED CONDITION ASSOCIATED WITH FEMALE GENITAL ORGANS AND MENSTRUAL CYCLE: ICD-10-CM

## 2023-09-13 DIAGNOSIS — Z13.21 ENCOUNTER FOR SCREENING FOR NUTRITIONAL DISORDER: ICD-10-CM

## 2023-09-13 DIAGNOSIS — Z13.220 ENCOUNTER FOR SCREENING FOR LIPOID DISORDERS: ICD-10-CM

## 2023-09-13 DIAGNOSIS — Z13.1 ENCOUNTER FOR SCREENING FOR DIABETES MELLITUS: ICD-10-CM

## 2023-09-13 DIAGNOSIS — D72.820 LYMPHOCYTOSIS (SYMPTOMATIC): ICD-10-CM

## 2023-09-13 DIAGNOSIS — J03.91 ACUTE RECURRENT TONSILLITIS, UNSPECIFIED: ICD-10-CM

## 2023-09-13 DIAGNOSIS — Z00.00 ENCOUNTER FOR GENERAL ADULT MEDICAL EXAMINATION W/OUT ABNORMAL FINDINGS: ICD-10-CM

## 2023-09-13 PROCEDURE — 99395 PREV VISIT EST AGE 18-39: CPT | Mod: 25

## 2023-09-13 PROCEDURE — G0444 DEPRESSION SCREEN ANNUAL: CPT | Mod: 59

## 2023-09-22 ENCOUNTER — APPOINTMENT (OUTPATIENT)
Dept: FAMILY MEDICINE | Facility: CLINIC | Age: 28
End: 2023-09-22
Payer: COMMERCIAL

## 2023-09-22 VITALS
BODY MASS INDEX: 25.52 KG/M2 | SYSTOLIC BLOOD PRESSURE: 104 MMHG | OXYGEN SATURATION: 98 % | HEART RATE: 83 BPM | WEIGHT: 144 LBS | TEMPERATURE: 97.7 F | HEIGHT: 63 IN | DIASTOLIC BLOOD PRESSURE: 64 MMHG

## 2023-09-22 DIAGNOSIS — J30.9 ALLERGIC RHINITIS, UNSPECIFIED: ICD-10-CM

## 2023-09-22 DIAGNOSIS — E03.9 HYPOTHYROIDISM, UNSPECIFIED: ICD-10-CM

## 2023-09-22 PROCEDURE — 99214 OFFICE O/P EST MOD 30 MIN: CPT

## 2023-09-22 RX ORDER — FLUTICASONE PROPIONATE 50 UG/1
50 SPRAY, METERED NASAL TWICE DAILY
Qty: 1 | Refills: 1 | Status: DISCONTINUED | COMMUNITY
Start: 2022-08-31 | End: 2023-09-22

## 2023-09-23 ENCOUNTER — LABORATORY RESULT (OUTPATIENT)
Age: 28
End: 2023-09-23

## 2023-10-10 ENCOUNTER — APPOINTMENT (OUTPATIENT)
Dept: OTOLARYNGOLOGY | Facility: CLINIC | Age: 28
End: 2023-10-10

## 2023-11-07 ENCOUNTER — APPOINTMENT (OUTPATIENT)
Dept: OTOLARYNGOLOGY | Facility: CLINIC | Age: 28
End: 2023-11-07

## 2023-11-30 ENCOUNTER — APPOINTMENT (OUTPATIENT)
Dept: RHEUMATOLOGY | Facility: CLINIC | Age: 28
End: 2023-11-30

## 2023-11-30 ENCOUNTER — APPOINTMENT (OUTPATIENT)
Dept: RHEUMATOLOGY | Facility: CLINIC | Age: 28
End: 2023-11-30
Payer: COMMERCIAL

## 2023-11-30 VITALS
HEART RATE: 71 BPM | OXYGEN SATURATION: 98 % | WEIGHT: 140 LBS | HEIGHT: 63 IN | BODY MASS INDEX: 24.8 KG/M2 | SYSTOLIC BLOOD PRESSURE: 108 MMHG | TEMPERATURE: 97.8 F | DIASTOLIC BLOOD PRESSURE: 70 MMHG

## 2023-11-30 DIAGNOSIS — M25.50 PAIN IN UNSPECIFIED JOINT: ICD-10-CM

## 2023-11-30 DIAGNOSIS — R76.8 OTHER SPECIFIED ABNORMAL IMMUNOLOGICAL FINDINGS IN SERUM: ICD-10-CM

## 2023-11-30 DIAGNOSIS — R53.83 OTHER FATIGUE: ICD-10-CM

## 2023-11-30 PROCEDURE — 99204 OFFICE O/P NEW MOD 45 MIN: CPT

## 2023-12-16 ENCOUNTER — NON-APPOINTMENT (OUTPATIENT)
Age: 28
End: 2023-12-16

## 2023-12-16 ENCOUNTER — APPOINTMENT (OUTPATIENT)
Dept: OBGYN | Facility: CLINIC | Age: 28
End: 2023-12-16
Payer: COMMERCIAL

## 2023-12-16 VITALS
BODY MASS INDEX: 24.8 KG/M2 | WEIGHT: 140 LBS | HEIGHT: 63 IN | SYSTOLIC BLOOD PRESSURE: 120 MMHG | DIASTOLIC BLOOD PRESSURE: 60 MMHG

## 2023-12-16 DIAGNOSIS — R87.810 ATYPICAL SQUAMOUS CELLS OF UNDETERMINED SIGNIFICANCE ON CYTOLOGIC SMEAR OF CERVIX (ASC-US): ICD-10-CM

## 2023-12-16 DIAGNOSIS — Z01.419 ENCOUNTER FOR GYNECOLOGICAL EXAMINATION (GENERAL) (ROUTINE) W/OUT ABNORMAL FINDINGS: ICD-10-CM

## 2023-12-16 DIAGNOSIS — Z12.4 ENCOUNTER FOR SCREENING FOR MALIGNANT NEOPLASM OF CERVIX: ICD-10-CM

## 2023-12-16 DIAGNOSIS — R87.610 ATYPICAL SQUAMOUS CELLS OF UNDETERMINED SIGNIFICANCE ON CYTOLOGIC SMEAR OF CERVIX (ASC-US): ICD-10-CM

## 2023-12-16 DIAGNOSIS — Z30.41 ENCOUNTER FOR SURVEILLANCE OF CONTRACEPTIVE PILLS: ICD-10-CM

## 2023-12-16 PROCEDURE — 99395 PREV VISIT EST AGE 18-39: CPT

## 2023-12-16 PROCEDURE — 81025 URINE PREGNANCY TEST: CPT

## 2023-12-16 RX ORDER — NORETHINDRONE ACETATE AND ETHINYL ESTRADIOL AND FERROUS FUMARATE 1.5-30(21)
1.5-3 KIT ORAL DAILY
Qty: 3 | Refills: 3 | Status: ACTIVE | COMMUNITY
Start: 2020-12-01 | End: 1900-01-01

## 2023-12-16 NOTE — HISTORY OF PRESENT ILLNESS
[Oral Contraceptive] : uses oral contraception pills [Y] : Patient is sexually active [Menarche Age: ____] : age at menarche was [unfilled] [N] : Patient denies prior pregnancies [Currently Active] : currently active [PapSmeardate] : 12/10/22 [TextBox_31] : ASC-US [GonorrheaDate] : 12/10/22 [TextBox_63] : NEG [ChlamydiaDate] : 12/10/22 [TextBox_68] : NEG [HPVDate] : 12/10/22 [TextBox_78] : POS. HPV 16&18/45-NEG [LMPDate] : 11/27/23 [PGHxTotal] : 0 [FreeTextEntry1] : 11/27/23

## 2024-01-06 LAB
C TRACH RRNA SPEC QL NAA+PROBE: NOT DETECTED
CYTOLOGY CVX/VAG DOC THIN PREP: ABNORMAL
HCG UR QL: NEGATIVE
N GONORRHOEA RRNA SPEC QL NAA+PROBE: NOT DETECTED
QUALITY CONTROL: YES
SOURCE TP AMPLIFICATION: NORMAL

## 2024-01-16 ENCOUNTER — RX RENEWAL (OUTPATIENT)
Age: 29
End: 2024-01-16

## 2024-01-17 ENCOUNTER — APPOINTMENT (OUTPATIENT)
Dept: OBGYN | Facility: CLINIC | Age: 29
End: 2024-01-17
Payer: COMMERCIAL

## 2024-01-17 VITALS
WEIGHT: 142 LBS | SYSTOLIC BLOOD PRESSURE: 118 MMHG | DIASTOLIC BLOOD PRESSURE: 66 MMHG | HEIGHT: 63 IN | BODY MASS INDEX: 25.16 KG/M2

## 2024-01-17 DIAGNOSIS — R87.612 LOW GRADE SQUAMOUS INTRAEPITHELIAL LESION ON CYTOLOGIC SMEAR OF CERVIX (LGSIL): ICD-10-CM

## 2024-01-17 LAB
HCG UR QL: NEGATIVE
QUALITY CONTROL: YES

## 2024-01-17 PROCEDURE — 81025 URINE PREGNANCY TEST: CPT

## 2024-01-17 PROCEDURE — 57454 BX/CURETT OF CERVIX W/SCOPE: CPT

## 2024-01-17 NOTE — HISTORY OF PRESENT ILLNESS
[Gonorrhea test offered] : Gonorrhea test offered [Chlamydia test offered] : Chlamydia test offered [Oral Contraceptive] : uses oral contraception pills [Y] : Patient is sexually active [N] : Patient denies prior pregnancies [Menarche Age: ____] : age at menarche was [unfilled] [Currently Active] : currently active [Men] : men [PapSmeardate] : 12/16/2023 [TextBox_31] : LSIL [TextBox_63] : 12/16/2023 [LMPDate] : 12/27/23 [TextBox_68] : 12/16/2023 [PGHxTotal] : 0 [FreeTextEntry1] : 12/27/23

## 2024-01-17 NOTE — PROCEDURE
[Colposcopy] : Colposcopy  [Consent Obtained] : Consent obtained [Risks] : risks [Benefits] : benefits [Patient] : patient [Infection] : infection [Bleeding] : bleeding [Allergic Reaction] : allergic reaction [LGSIL] : LGSIL [Colposcopy Adequate] : colposcopy adequate [SCI Fully Visualized] : SCI fully visualized [ECC Performed] : ECC performed [Biopsy] : biopsy taken [Hemostasis Obtained] : Hemostasis obtained [Tolerated Well] : the patient tolerated the procedure well [de-identified] : 2 [de-identified] : 12 o'clock & 3 o'clock [de-identified] : With silver nitrate. [de-identified] : Will call with colpo results.   Repeat pap in one year.

## 2024-01-17 NOTE — PLAN
[FreeTextEntry1] : Colposcopy was done today. Cervical biopsies at 12 & 3 o'clock. ECC performed. Hemostasis was observed with silver nitrate. Patient tolerated the procedure well with no complications. Post-procedure instructions were given. Patient expressed understanding. All questions were answered.  F/u pap in one year or as needed.

## 2024-01-17 NOTE — END OF VISIT
[FreeTextEntry3] : I, Drew Ramos solely acted as scribe for Dr. Fariha Howard on 01/17/2024  All medical entries made by the Scribe were at my, Dr. Zavala, direction and personally dictated by me on 01/17/2024 . I have reviewed the chart and agree that the record accurately reflects my personal performance of the history, physical exam, assessment and plan. I have also personally directed, reviewed, and agreed with the chart.

## 2024-02-07 ENCOUNTER — NON-APPOINTMENT (OUTPATIENT)
Age: 29
End: 2024-02-07

## 2024-02-07 LAB — CORE LAB BIOPSY: NORMAL

## 2024-03-27 ENCOUNTER — RX RENEWAL (OUTPATIENT)
Age: 29
End: 2024-03-27

## 2024-03-27 RX ORDER — LEVOTHYROXINE SODIUM 0.03 MG/1
25 TABLET ORAL
Qty: 90 | Refills: 1 | Status: ACTIVE | COMMUNITY
Start: 2020-12-15 | End: 1900-01-01

## 2024-04-02 ENCOUNTER — APPOINTMENT (OUTPATIENT)
Dept: DERMATOLOGY | Facility: CLINIC | Age: 29
End: 2024-04-02
Payer: COMMERCIAL

## 2024-04-02 DIAGNOSIS — Z80.8 FAMILY HISTORY OF MALIGNANT NEOPLASM OF OTHER ORGANS OR SYSTEMS: ICD-10-CM

## 2024-04-02 DIAGNOSIS — D22.5 MELANOCYTIC NEVI OF TRUNK: ICD-10-CM

## 2024-04-02 DIAGNOSIS — Z80.9 FAMILY HISTORY OF MALIGNANT NEOPLASM, UNSPECIFIED: ICD-10-CM

## 2024-04-02 DIAGNOSIS — L81.4 OTHER MELANIN HYPERPIGMENTATION: ICD-10-CM

## 2024-04-02 PROCEDURE — 99202 OFFICE O/P NEW SF 15 MIN: CPT

## 2024-04-02 NOTE — PHYSICAL EXAM
[Alert] : alert [Oriented x 3] : ~L oriented x 3 [FreeTextEntry3] : The following areas were examined and no significant abnormalities were seen except as noted below:  Type II skin  scalp, face, eyelids, nose, lips, ears, neck, chest, abdomen, back, buttocks, right arm, left arm, right hand, left hand, right  leg, left leg, right foot, left foot Breast and groin exams offered and declined by patient.  Patient is tan  Right deltoid: 1 mm black macule-fine reticulation seen on dermoscopy-not suspicious Back and buttocks: Mild small brown macules  No suspicious lesions seen [Well Nourished] : well nourished

## 2024-04-02 NOTE — PLAN
[TextEntry] : Reassurance about all skin growths  Return as needed  Bonnie, medical assistant, served as chaperone and was present for the entire skin exam.

## 2024-04-02 NOTE — HISTORY OF PRESENT ILLNESS
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  Particularly concerned about a lesion on the right shoulder.  No history of skin cancer.

## 2024-04-02 NOTE — ASSESSMENT
[FreeTextEntry1] : Incipient ink spot lentigo on right deltoid Melanocytic nevi on back and buttocks

## 2024-05-03 ENCOUNTER — APPOINTMENT (OUTPATIENT)
Dept: FAMILY MEDICINE | Facility: CLINIC | Age: 29
End: 2024-05-03
Payer: COMMERCIAL

## 2024-05-03 DIAGNOSIS — Z11.1 ENCOUNTER FOR SCREENING FOR RESPIRATORY TUBERCULOSIS: ICD-10-CM

## 2024-05-03 PROCEDURE — 86580 TB INTRADERMAL TEST: CPT

## 2024-05-06 ENCOUNTER — APPOINTMENT (OUTPATIENT)
Dept: FAMILY MEDICINE | Facility: CLINIC | Age: 29
End: 2024-05-06

## 2024-05-12 ENCOUNTER — NON-APPOINTMENT (OUTPATIENT)
Age: 29
End: 2024-05-12

## 2024-05-15 ENCOUNTER — APPOINTMENT (OUTPATIENT)
Dept: FAMILY MEDICINE | Facility: CLINIC | Age: 29
End: 2024-05-15

## 2024-05-17 ENCOUNTER — APPOINTMENT (OUTPATIENT)
Dept: FAMILY MEDICINE | Facility: CLINIC | Age: 29
End: 2024-05-17

## 2024-09-10 ENCOUNTER — RX RENEWAL (OUTPATIENT)
Age: 29
End: 2024-09-10

## 2024-11-27 ENCOUNTER — APPOINTMENT (OUTPATIENT)
Dept: FAMILY MEDICINE | Facility: CLINIC | Age: 29
End: 2024-11-27
Payer: COMMERCIAL

## 2024-11-27 VITALS
DIASTOLIC BLOOD PRESSURE: 70 MMHG | HEIGHT: 63 IN | HEART RATE: 68 BPM | SYSTOLIC BLOOD PRESSURE: 110 MMHG | OXYGEN SATURATION: 98 % | BODY MASS INDEX: 24.45 KG/M2 | WEIGHT: 138 LBS

## 2024-11-27 DIAGNOSIS — R76.8 OTHER SPECIFIED ABNORMAL IMMUNOLOGICAL FINDINGS IN SERUM: ICD-10-CM

## 2024-11-27 DIAGNOSIS — R87.810 ATYPICAL SQUAMOUS CELLS OF UNDETERMINED SIGNIFICANCE ON CYTOLOGIC SMEAR OF CERVIX (ASC-US): ICD-10-CM

## 2024-11-27 DIAGNOSIS — Z00.00 ENCOUNTER FOR GENERAL ADULT MEDICAL EXAMINATION W/OUT ABNORMAL FINDINGS: ICD-10-CM

## 2024-11-27 DIAGNOSIS — L81.4 OTHER MELANIN HYPERPIGMENTATION: ICD-10-CM

## 2024-11-27 DIAGNOSIS — R87.612 LOW GRADE SQUAMOUS INTRAEPITHELIAL LESION ON CYTOLOGIC SMEAR OF CERVIX (LGSIL): ICD-10-CM

## 2024-11-27 DIAGNOSIS — E03.9 HYPOTHYROIDISM, UNSPECIFIED: ICD-10-CM

## 2024-11-27 DIAGNOSIS — Z13.220 ENCOUNTER FOR SCREENING FOR LIPOID DISORDERS: ICD-10-CM

## 2024-11-27 DIAGNOSIS — Z11.3 ENCOUNTER FOR SCREENING FOR INFECTIONS WITH A PREDOMINANTLY SEXUAL MODE OF TRANSMISSION: ICD-10-CM

## 2024-11-27 DIAGNOSIS — R53.83 OTHER FATIGUE: ICD-10-CM

## 2024-11-27 DIAGNOSIS — Z87.09 PERSONAL HISTORY OF OTHER DISEASES OF THE RESPIRATORY SYSTEM: ICD-10-CM

## 2024-11-27 DIAGNOSIS — R87.610 ATYPICAL SQUAMOUS CELLS OF UNDETERMINED SIGNIFICANCE ON CYTOLOGIC SMEAR OF CERVIX (ASC-US): ICD-10-CM

## 2024-11-27 DIAGNOSIS — Z13.1 ENCOUNTER FOR SCREENING FOR DIABETES MELLITUS: ICD-10-CM

## 2024-11-27 DIAGNOSIS — M25.50 PAIN IN UNSPECIFIED JOINT: ICD-10-CM

## 2024-11-27 PROCEDURE — 99395 PREV VISIT EST AGE 18-39: CPT

## 2024-12-28 ENCOUNTER — APPOINTMENT (OUTPATIENT)
Dept: OBGYN | Facility: CLINIC | Age: 29
End: 2024-12-28

## 2025-01-02 PROBLEM — R82.90 ABNORMAL URINALYSIS: Status: ACTIVE | Noted: 2025-01-02

## 2025-01-02 PROBLEM — E78.5 MILD HYPERLIPIDEMIA: Status: ACTIVE | Noted: 2025-01-02

## 2025-01-03 ENCOUNTER — NON-APPOINTMENT (OUTPATIENT)
Age: 30
End: 2025-01-03

## 2025-01-08 ENCOUNTER — APPOINTMENT (OUTPATIENT)
Dept: OBGYN | Facility: CLINIC | Age: 30
End: 2025-01-08
Payer: COMMERCIAL

## 2025-01-08 VITALS
WEIGHT: 138 LBS | BODY MASS INDEX: 24.45 KG/M2 | DIASTOLIC BLOOD PRESSURE: 80 MMHG | HEIGHT: 63 IN | SYSTOLIC BLOOD PRESSURE: 122 MMHG

## 2025-01-08 DIAGNOSIS — Z01.419 ENCOUNTER FOR GYNECOLOGICAL EXAMINATION (GENERAL) (ROUTINE) W/OUT ABNORMAL FINDINGS: ICD-10-CM

## 2025-01-08 DIAGNOSIS — E03.9 HYPOTHYROIDISM, UNSPECIFIED: ICD-10-CM

## 2025-01-08 PROCEDURE — 99459 PELVIC EXAMINATION: CPT

## 2025-01-08 PROCEDURE — 99385 PREV VISIT NEW AGE 18-39: CPT

## 2025-01-09 ENCOUNTER — TRANSCRIPTION ENCOUNTER (OUTPATIENT)
Age: 30
End: 2025-01-09

## 2025-01-09 DIAGNOSIS — Z30.9 ENCOUNTER FOR CONTRACEPTIVE MANAGEMENT, UNSPECIFIED: ICD-10-CM

## 2025-01-09 LAB
C TRACH RRNA SPEC QL NAA+PROBE: NOT DETECTED
HPV HIGH+LOW RISK DNA PNL CVX: NOT DETECTED
N GONORRHOEA RRNA SPEC QL NAA+PROBE: NOT DETECTED
SOURCE TP AMPLIFICATION: NORMAL

## 2025-01-09 RX ORDER — NORETHINDRONE ACETATE AND ETHINYL ESTRADIOL AND FERROUS FUMARATE 1.5-30(21)
1.5-3 KIT ORAL
Qty: 3 | Refills: 1 | Status: ACTIVE | COMMUNITY
Start: 2025-01-09

## 2025-01-09 RX ORDER — NORETHINDRONE ACETATE AND ETHINYL ESTRADIOL AND FERROUS FUMARATE 1MG-20(21)
1-20 KIT ORAL DAILY
Qty: 3 | Refills: 1 | Status: DISCONTINUED | COMMUNITY
Start: 2025-01-09 | End: 2025-01-09

## 2025-01-11 DIAGNOSIS — R82.90 UNSPECIFIED ABNORMAL FINDINGS IN URINE: ICD-10-CM

## 2025-01-15 PROBLEM — R82.90 ABNORMAL URINALYSIS: Status: RESOLVED | Noted: 2025-01-02 | Resolved: 2025-01-15

## 2025-01-15 LAB — CYTOLOGY CVX/VAG DOC THIN PREP: NORMAL

## 2025-08-27 ENCOUNTER — RX CHANGE (OUTPATIENT)
Age: 30
End: 2025-08-27

## 2025-09-17 ENCOUNTER — NON-APPOINTMENT (OUTPATIENT)
Age: 30
End: 2025-09-17

## 2025-09-18 ENCOUNTER — APPOINTMENT (OUTPATIENT)
Dept: FAMILY MEDICINE | Facility: CLINIC | Age: 30
End: 2025-09-18
Payer: COMMERCIAL

## 2025-09-18 VITALS
OXYGEN SATURATION: 97 % | HEART RATE: 70 BPM | HEIGHT: 63 IN | DIASTOLIC BLOOD PRESSURE: 74 MMHG | SYSTOLIC BLOOD PRESSURE: 110 MMHG | BODY MASS INDEX: 25.87 KG/M2 | WEIGHT: 146 LBS

## 2025-09-18 DIAGNOSIS — Z88.7 ALLERGY STATUS TO SERUM AND VACCINE: ICD-10-CM

## 2025-09-18 DIAGNOSIS — E78.5 HYPERLIPIDEMIA, UNSPECIFIED: ICD-10-CM

## 2025-09-18 DIAGNOSIS — E03.9 HYPOTHYROIDISM, UNSPECIFIED: ICD-10-CM

## 2025-09-18 DIAGNOSIS — J30.9 ALLERGIC RHINITIS, UNSPECIFIED: ICD-10-CM

## 2025-09-18 PROCEDURE — 99214 OFFICE O/P EST MOD 30 MIN: CPT

## 2025-09-28 LAB
CHOLEST SERPL-MCNC: 228 MG/DL
HDLC SERPL-MCNC: 81 MG/DL
LDLC SERPL-MCNC: 134 MG/DL
NONHDLC SERPL-MCNC: 147 MG/DL
THYROGLOB AB SERPL-ACNC: 20.3 IU/ML
THYROPEROXIDASE AB SERPL IA-ACNC: 13.8 IU/ML
TRIGL SERPL-MCNC: 76 MG/DL
TSH RECEPTOR AB: <1.1 IU/L
TSH SERPL-ACNC: 2.49 UIU/ML